# Patient Record
Sex: MALE | Race: WHITE | URBAN - METROPOLITAN AREA
[De-identification: names, ages, dates, MRNs, and addresses within clinical notes are randomized per-mention and may not be internally consistent; named-entity substitution may affect disease eponyms.]

---

## 2018-03-11 PROBLEM — R07.9 CHEST PAIN: Status: ACTIVE | Noted: 2018-03-11

## 2018-03-12 PROBLEM — Z72.0 TOBACCO ABUSE: Status: ACTIVE | Noted: 2018-03-12

## 2018-03-12 PROBLEM — R94.39 ABNORMAL NUCLEAR STRESS TEST: Status: ACTIVE | Noted: 2018-03-12

## 2018-03-23 ENCOUNTER — OFFICE VISIT (OUTPATIENT)
Dept: CARDIOLOGY CLINIC | Age: 58
End: 2018-03-23

## 2018-03-23 ENCOUNTER — TELEPHONE (OUTPATIENT)
Dept: CARDIOLOGY CLINIC | Age: 58
End: 2018-03-23

## 2018-03-23 VITALS
DIASTOLIC BLOOD PRESSURE: 94 MMHG | WEIGHT: 204.8 LBS | HEART RATE: 81 BPM | SYSTOLIC BLOOD PRESSURE: 144 MMHG | HEIGHT: 74 IN | BODY MASS INDEX: 26.28 KG/M2

## 2018-03-23 DIAGNOSIS — Z98.61 S/P PTCA (PERCUTANEOUS TRANSLUMINAL CORONARY ANGIOPLASTY): Primary | ICD-10-CM

## 2018-03-23 PROCEDURE — 99214 OFFICE O/P EST MOD 30 MIN: CPT | Performed by: INTERNAL MEDICINE

## 2018-03-23 PROCEDURE — 93000 ELECTROCARDIOGRAM COMPLETE: CPT | Performed by: INTERNAL MEDICINE

## 2018-03-23 NOTE — PROGRESS NOTES
Chela Yan MD        OFFICE  FOLLOWUP NOTE    Chief complaints: patient is here for management of chest pain, CAD,HTN, DYSLPIDEMIA. smoking    Subjective: patient feels better, occasional chest pain, no shortness of breath, no dizziness, no palpitations    HPI Chris Escalona is a 56 y. o.year old who  has a past medical history of DJD (degenerative joint disease); H/O cardiac catheterization; Hypertension; and S/P PTCA (percutaneous transluminal coronary angioplasty). and presents for management of chest pain, CAD,HTN, DYSLPIDEMIA. smoking which are well controlled, he still smokes,has  chest pain for last few weeks, happening daily, intermittent for 15 to 20 mins and aggravated with activity substernal also,reproducible with palpation, radiated to shoulder, 6/10, tender to touch,associated with shortness of breath, + sweating, nausea, he had two stents, one in OM1 and other in LCX. Current Outpatient Prescriptions   Medication Sig Dispense Refill    aspirin 81 MG chewable tablet Take 1 tablet by mouth daily 30 tablet 0    amLODIPine (NORVASC) 10 MG tablet Take 1 tablet by mouth daily 30 tablet 0    atorvastatin (LIPITOR) 80 MG tablet Take 1 tablet by mouth nightly 30 tablet 0    carvedilol (COREG) 3.125 MG tablet Take 1 tablet by mouth 2 times daily (with meals) 60 tablet 0    pantoprazole (PROTONIX) 40 MG tablet Take 1 tablet by mouth every morning (before breakfast) 30 tablet 0    clopidogrel (PLAVIX) 75 MG tablet Take 1 tablet by mouth daily 30 tablet 0    cyclobenzaprine (FLEXERIL) 10 MG tablet Take 1 tablet by mouth 3 times daily as needed for Muscle spasms 30 tablet 0     No current facility-administered medications for this visit. Allergies:  Other  Past Medical History:   Diagnosis Date    DJD (degenerative joint disease)     H/O cardiac catheterization 03/14/2018    Hypertension     S/P PTCA (percutaneous transluminal coronary angioplasty) 03/14/2018     History reviewed. No pertinent surgical history. History reviewed. No pertinent family history. Social History   Substance Use Topics    Smoking status: Current Every Day Smoker     Packs/day: 1.00     Types: Cigarettes    Smokeless tobacco: Current User     Types: Snuff    Alcohol use No      [unfilled]  Review of Systems:   · Constitutional: No Fever or Weight Loss   · Eyes: No Decreased Vision  · ENT: No Headaches, Hearing Loss or Vertigo  · Cardiovascular: No chest pain, dyspnea on exertion, palpitations or loss of consciousness  · Respiratory: No cough or wheezing    · Gastrointestinal: No abdominal pain, appetite loss, blood in stools, constipation, diarrhea or heartburn  · Genitourinary: No dysuria, trouble voiding, or hematuria  · Musculoskeletal:  No gait disturbance, weakness or joint complaints  · Integumentary: No rash or pruritis  · Neurological: No TIA or stroke symptoms  · Psychiatric: No anxiety or depression  · Endocrine: No malaise, fatigue or temperature intolerance  · Hematologic/Lymphatic: No bleeding problems, blood clots or swollen lymph nodes  · Allergic/Immunologic: No nasal congestion or hives  All systems negative except as marked. Objective:  BP (!) 144/94 (Site: Left Arm, Position: Sitting, Cuff Size: Large Adult)   Pulse 81   Ht 6' 2\" (1.88 m)   Wt 204 lb 12.8 oz (92.9 kg)   BMI 26.29 kg/m²   Wt Readings from Last 3 Encounters:   03/23/18 204 lb 12.8 oz (92.9 kg)   03/14/18 208 lb 14.4 oz (94.8 kg)   03/08/18 210 lb (95.3 kg)     Body mass index is 26.29 kg/m². GENERAL - Alert, oriented, pleasant, in no apparent distress,normal grooming  HEENT  pupils are reactive to light and accomodation, cornea intact, conjunctive normal color, ears are normal in exam,throat exam in normal, teeth, gum and palate are normal, oral mucosa is normal without any notation of pallor or cyanosis  Neck - Supple. No jugular venous distention noted.  No carotid bruits, no apical -carotid delay  Respiratory: continue lisinopril and lopressor  3. Arthritis: stable  4. RECOMMEND to stop smoking  5. Work restriction: will get stress test and then release for his work  6. Health maintenance: exerise and diet  All labs, medications and tests reviewed, continue all other medications of all above medical condition listed as is.     [unfilled]

## 2018-03-27 ENCOUNTER — PROCEDURE VISIT (OUTPATIENT)
Dept: CARDIOLOGY CLINIC | Age: 58
End: 2018-03-27

## 2018-03-27 VITALS
WEIGHT: 204 LBS | SYSTOLIC BLOOD PRESSURE: 142 MMHG | DIASTOLIC BLOOD PRESSURE: 78 MMHG | HEIGHT: 74 IN | BODY MASS INDEX: 26.18 KG/M2 | HEART RATE: 93 BPM

## 2018-03-27 DIAGNOSIS — Z98.61 S/P PTCA (PERCUTANEOUS TRANSLUMINAL CORONARY ANGIOPLASTY): ICD-10-CM

## 2018-03-27 PROCEDURE — 93015 CV STRESS TEST SUPVJ I&R: CPT | Performed by: INTERNAL MEDICINE

## 2018-03-29 ENCOUNTER — TELEPHONE (OUTPATIENT)
Dept: CARDIOLOGY CLINIC | Age: 58
End: 2018-03-29

## 2018-03-29 NOTE — TELEPHONE ENCOUNTER
Patient called stating he has stents put in on 3/21/18. He had G[employer] treadmill and has been referred to cardiac rehab. 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 He wants to know when he can return to work and would like his medications refilled. His follow up appointment is 6/22/18 after cardiac rehab and he want's to know if he should make a sooner appointment.

## 2018-04-02 RX ORDER — CARVEDILOL 3.12 MG/1
3.12 TABLET ORAL 2 TIMES DAILY WITH MEALS
Qty: 60 TABLET | Refills: 5 | Status: SHIPPED | OUTPATIENT
Start: 2018-04-02 | End: 2018-07-23 | Stop reason: SDUPTHER

## 2018-04-02 RX ORDER — ATORVASTATIN CALCIUM 80 MG/1
80 TABLET, FILM COATED ORAL NIGHTLY
Qty: 30 TABLET | Refills: 5 | Status: SHIPPED | OUTPATIENT
Start: 2018-04-02 | End: 2018-10-19 | Stop reason: SDUPTHER

## 2018-04-02 RX ORDER — AMLODIPINE BESYLATE 10 MG/1
10 TABLET ORAL DAILY
Qty: 30 TABLET | Refills: 5 | Status: SHIPPED | OUTPATIENT
Start: 2018-04-02 | End: 2018-10-19 | Stop reason: SDUPTHER

## 2018-04-02 RX ORDER — ASPIRIN 81 MG/1
81 TABLET, CHEWABLE ORAL DAILY
Qty: 30 TABLET | Refills: 5 | Status: SHIPPED | OUTPATIENT
Start: 2018-04-02 | End: 2018-10-19 | Stop reason: SDUPTHER

## 2018-04-02 RX ORDER — CLOPIDOGREL BISULFATE 75 MG/1
75 TABLET ORAL DAILY
Qty: 30 TABLET | Refills: 5 | Status: SHIPPED | OUTPATIENT
Start: 2018-04-02 | End: 2018-10-19 | Stop reason: SDUPTHER

## 2018-04-09 ENCOUNTER — TELEPHONE (OUTPATIENT)
Dept: CARDIOLOGY CLINIC | Age: 58
End: 2018-04-09

## 2018-07-23 ENCOUNTER — OFFICE VISIT (OUTPATIENT)
Dept: CARDIOLOGY CLINIC | Age: 58
End: 2018-07-23

## 2018-07-23 VITALS
BODY MASS INDEX: 26.15 KG/M2 | DIASTOLIC BLOOD PRESSURE: 88 MMHG | SYSTOLIC BLOOD PRESSURE: 150 MMHG | HEIGHT: 74 IN | HEART RATE: 84 BPM | WEIGHT: 203.8 LBS

## 2018-07-23 DIAGNOSIS — I10 ESSENTIAL HYPERTENSION: ICD-10-CM

## 2018-07-23 DIAGNOSIS — I25.10 CORONARY ARTERY DISEASE INVOLVING NATIVE CORONARY ARTERY OF NATIVE HEART WITHOUT ANGINA PECTORIS: Primary | ICD-10-CM

## 2018-07-23 PROCEDURE — 99214 OFFICE O/P EST MOD 30 MIN: CPT | Performed by: INTERNAL MEDICINE

## 2018-07-23 RX ORDER — CARVEDILOL 6.25 MG/1
6.25 TABLET ORAL 2 TIMES DAILY
Qty: 180 TABLET | Refills: 3 | Status: SHIPPED | OUTPATIENT
Start: 2018-07-23 | End: 2018-08-23 | Stop reason: SDUPTHER

## 2018-07-23 NOTE — PATIENT INSTRUCTIONS
Please remember to bring all medication bottles or a medication list with you to your appointment. If you have any questions, please call our office at [phone].

## 2018-07-23 NOTE — PROGRESS NOTES
cornea intact, conjunctive normal color, ears are normal in exam,throat exam in normal, teeth, gum and palate are normal, oral mucosa is normal without any notation of pallor or cyanosis  Neck - Supple. No jugular venous distention noted. No carotid bruits, no apical -carotid delay  Respiratory:  Normal breath sounds, No respiratory distress, No wheezing, No chest tenderness. ,no use of accessory muscles, diaphragm movement is normal  Cardiovascular: (PMI) apex non displaced,no lifts no thrills, no s3,no s4, Normal heart rate, Normal rhythm, No murmurs, No rubs, No gallops. Carotid arteries pulse and amplitude are normal no bruit, no abdominal bruit noted ( normal abdominal aorta ausculation), femoral arteries pulse and amplitude are normal no bruit, pedal pulses are normal  Femoral pulses have normal amplitude, no bruits   Extremities - No cyanosis, clubbing, or significant edema, no varicose veins    Abdomen  No masses, tenderness, or organomegaly, no hepato-splenomegally, no bruits  Musculoskeletal  No significant edema, no kyphosis or scoliosis, no deformity in any extremity noted, muscle strength and tone are normal  Skin: no ulcer,no scar,no stasis dermatitis   Neurologic  alert oriented times 3,Cranial nerves II through XII are grossly intact. There were no gross focal neurologic abnormalities. All sensory and motor nerves examined and were normal  Psychiatric: normal mood and affect    No results found for: CKTOTAL, CKMB, CKMBINDEX, TROPONINI  BNP:  No results found for: BNP  PT/INR:  No results found for: PTINR  Lab Results   Component Value Date    LABA1C 5.4 03/10/2018     Lab Results   Component Value Date    CHOL 195 03/11/2018    TRIG 135 03/11/2018    HDL 29 (L) 03/11/2018    LDLDIRECT 145 (H) 03/11/2018     Lab Results   Component Value Date    ALT 17 03/10/2018    AST 17 03/10/2018     TSH:  No results found for: TSH    Impression:  Pio Aviles is a 66 y. o.year old who  has a past medical history of DJD (degenerative joint disease); H/O cardiac catheterization; H/O percutaneous left heart catheterization; History of exercise stress test; Hypertension; and S/P PTCA (percutaneous transluminal coronary angioplasty). and presents with     Plan:  1. Chest pain\" S/P PCI of LCX AND OM\1, NORMAL echo, CONTINUE DAPT, BB, STATINS,  2. HTN\" continue coreg and norvasc,increase coreg to 6.25mg bid  3. Arthritis: stable  4. RECOMMEND to stop smoking  5. Health maintenance: exerise and diet  All labs, medications and tests reviewed, continue all other medications of all above medical condition listed as is.     @[facility]@

## 2018-08-13 ENCOUNTER — HOSPITAL ENCOUNTER (OUTPATIENT)
Dept: GENERAL RADIOLOGY | Age: 58
Discharge: HOME | End: 2018-08-13
Attending: INTERNAL MEDICINE | Admitting: INTERNAL MEDICINE

## 2018-08-13 LAB
ALBUMIN SERPL-MCNC: 3.8 GM/DL (ref 3.4–5)
ALP BLD-CCNC: 79 IU/L (ref 40–129)
ALT SERPL-CCNC: 28 U/L (ref 10–40)
AST SERPL-CCNC: 20 IU/L (ref 15–37)
BILIRUB SERPL-MCNC: 0.2 MG/DL (ref 0–1)
BILIRUBIN DIRECT: 0.2 MG/DL (ref 0–0.3)
BILIRUBIN, INDIRECT: 0 MG/DL (ref 0–0.7)
CHOLESTEROL: 99 MG/DL
HDLC SERPL-MCNC: 30 MG/DL
LDL CHOLESTEROL DIRECT: 68 MG/DL
TOTAL PROTEIN: 6.1 GM/DL (ref 6.4–8.2)
TRIGL SERPL-MCNC: 87 MG/DL

## 2018-08-23 DIAGNOSIS — I10 ESSENTIAL HYPERTENSION: ICD-10-CM

## 2018-08-23 DIAGNOSIS — I25.10 CORONARY ARTERY DISEASE INVOLVING NATIVE CORONARY ARTERY OF NATIVE HEART WITHOUT ANGINA PECTORIS: ICD-10-CM

## 2018-08-23 RX ORDER — CARVEDILOL 6.25 MG/1
6.25 TABLET ORAL 2 TIMES DAILY
Qty: 180 TABLET | Refills: 3 | Status: SHIPPED | OUTPATIENT
Start: 2018-08-23 | End: 2018-10-19 | Stop reason: SDUPTHER

## 2018-10-19 DIAGNOSIS — I10 ESSENTIAL HYPERTENSION: ICD-10-CM

## 2018-10-19 DIAGNOSIS — I25.10 CORONARY ARTERY DISEASE INVOLVING NATIVE CORONARY ARTERY OF NATIVE HEART WITHOUT ANGINA PECTORIS: ICD-10-CM

## 2018-10-19 RX ORDER — CLOPIDOGREL BISULFATE 75 MG/1
75 TABLET ORAL DAILY
Qty: 30 TABLET | Refills: 5 | Status: SHIPPED | OUTPATIENT
Start: 2018-10-19 | End: 2019-03-18 | Stop reason: SDUPTHER

## 2018-10-19 RX ORDER — ATORVASTATIN CALCIUM 80 MG/1
80 TABLET, FILM COATED ORAL NIGHTLY
Qty: 30 TABLET | Refills: 5 | Status: SHIPPED | OUTPATIENT
Start: 2018-10-19 | End: 2018-10-19 | Stop reason: SDUPTHER

## 2018-10-19 RX ORDER — CARVEDILOL 6.25 MG/1
6.25 TABLET ORAL 2 TIMES DAILY
Qty: 60 TABLET | Refills: 5 | Status: SHIPPED | OUTPATIENT
Start: 2018-10-19 | End: 2019-03-18 | Stop reason: SDUPTHER

## 2018-10-19 RX ORDER — ATORVASTATIN CALCIUM 80 MG/1
80 TABLET, FILM COATED ORAL NIGHTLY
Qty: 30 TABLET | Refills: 5 | Status: SHIPPED | OUTPATIENT
Start: 2018-10-19 | End: 2019-03-21 | Stop reason: SDUPTHER

## 2018-10-19 RX ORDER — ASPIRIN 81 MG/1
81 TABLET, CHEWABLE ORAL DAILY
Qty: 30 TABLET | Refills: 5 | Status: SHIPPED | OUTPATIENT
Start: 2018-10-19 | End: 2019-04-17 | Stop reason: SDUPTHER

## 2018-10-19 RX ORDER — AMLODIPINE BESYLATE 10 MG/1
10 TABLET ORAL DAILY
Qty: 30 TABLET | Refills: 5 | Status: SHIPPED | OUTPATIENT
Start: 2018-10-19 | End: 2019-03-18 | Stop reason: SDUPTHER

## 2019-03-15 DIAGNOSIS — I10 ESSENTIAL HYPERTENSION: ICD-10-CM

## 2019-03-15 DIAGNOSIS — I25.10 CORONARY ARTERY DISEASE INVOLVING NATIVE CORONARY ARTERY OF NATIVE HEART WITHOUT ANGINA PECTORIS: ICD-10-CM

## 2019-03-18 RX ORDER — CLOPIDOGREL BISULFATE 75 MG/1
75 TABLET ORAL DAILY
Qty: 30 TABLET | Refills: 5 | Status: SHIPPED | OUTPATIENT
Start: 2019-03-18 | End: 2019-03-20 | Stop reason: SDUPTHER

## 2019-03-18 RX ORDER — AMLODIPINE BESYLATE 10 MG/1
10 TABLET ORAL DAILY
Qty: 30 TABLET | Refills: 5 | Status: SHIPPED | OUTPATIENT
Start: 2019-03-18 | End: 2019-03-20 | Stop reason: SDUPTHER

## 2019-03-18 RX ORDER — CARVEDILOL 6.25 MG/1
6.25 TABLET ORAL 2 TIMES DAILY
Qty: 60 TABLET | Refills: 5 | Status: SHIPPED | OUTPATIENT
Start: 2019-03-18 | End: 2019-03-20 | Stop reason: SDUPTHER

## 2019-03-18 RX ORDER — PANTOPRAZOLE SODIUM 40 MG/1
40 TABLET, DELAYED RELEASE ORAL
Qty: 30 TABLET | Refills: 0 | Status: SHIPPED | OUTPATIENT
Start: 2019-03-18

## 2019-03-20 ENCOUNTER — TELEPHONE (OUTPATIENT)
Dept: CARDIOLOGY CLINIC | Age: 59
End: 2019-03-20

## 2019-03-20 DIAGNOSIS — I10 ESSENTIAL HYPERTENSION: ICD-10-CM

## 2019-03-20 DIAGNOSIS — I25.10 CORONARY ARTERY DISEASE INVOLVING NATIVE CORONARY ARTERY OF NATIVE HEART WITHOUT ANGINA PECTORIS: ICD-10-CM

## 2019-03-20 RX ORDER — CARVEDILOL 6.25 MG/1
6.25 TABLET ORAL 2 TIMES DAILY
Qty: 60 TABLET | Refills: 5 | Status: SHIPPED | OUTPATIENT
Start: 2019-03-20 | End: 2019-03-21 | Stop reason: SDUPTHER

## 2019-03-20 RX ORDER — AMLODIPINE BESYLATE 10 MG/1
10 TABLET ORAL DAILY
Qty: 30 TABLET | Refills: 5 | Status: SHIPPED | OUTPATIENT
Start: 2019-03-20 | End: 2019-03-21 | Stop reason: SDUPTHER

## 2019-03-20 RX ORDER — CLOPIDOGREL BISULFATE 75 MG/1
75 TABLET ORAL DAILY
Qty: 30 TABLET | Refills: 5 | Status: SHIPPED | OUTPATIENT
Start: 2019-03-20 | End: 2019-03-21 | Stop reason: SDUPTHER

## 2019-03-20 NOTE — TELEPHONE ENCOUNTER
Patient called 3/15 for several refills looks like   They may have been sent to to wrong pharmacy   They were to have been [facility]

## 2019-03-21 ENCOUNTER — OFFICE VISIT (OUTPATIENT)
Dept: CARDIOLOGY CLINIC | Age: 59
End: 2019-03-21

## 2019-03-21 ENCOUNTER — TELEPHONE (OUTPATIENT)
Dept: CARDIOLOGY CLINIC | Age: 59
End: 2019-03-21

## 2019-03-21 VITALS
DIASTOLIC BLOOD PRESSURE: 100 MMHG | WEIGHT: 211.2 LBS | SYSTOLIC BLOOD PRESSURE: 172 MMHG | HEART RATE: 86 BPM | HEIGHT: 74 IN | BODY MASS INDEX: 27.11 KG/M2

## 2019-03-21 DIAGNOSIS — I10 ESSENTIAL HYPERTENSION: ICD-10-CM

## 2019-03-21 DIAGNOSIS — Z98.61 S/P PTCA (PERCUTANEOUS TRANSLUMINAL CORONARY ANGIOPLASTY): ICD-10-CM

## 2019-03-21 DIAGNOSIS — I25.10 CORONARY ARTERY DISEASE INVOLVING NATIVE CORONARY ARTERY OF NATIVE HEART WITHOUT ANGINA PECTORIS: ICD-10-CM

## 2019-03-21 DIAGNOSIS — E78.49 OTHER HYPERLIPIDEMIA: ICD-10-CM

## 2019-03-21 DIAGNOSIS — R07.89 CHEST PRESSURE: ICD-10-CM

## 2019-03-21 DIAGNOSIS — Z72.0 TOBACCO ABUSE: Primary | ICD-10-CM

## 2019-03-21 PROCEDURE — 99214 OFFICE O/P EST MOD 30 MIN: CPT | Performed by: NURSE PRACTITIONER

## 2019-03-21 PROCEDURE — 93000 ELECTROCARDIOGRAM COMPLETE: CPT | Performed by: INTERNAL MEDICINE

## 2019-03-21 RX ORDER — ATORVASTATIN CALCIUM 80 MG/1
80 TABLET, FILM COATED ORAL NIGHTLY
Qty: 30 TABLET | Refills: 5 | Status: SHIPPED | OUTPATIENT
Start: 2019-03-21 | End: 2019-04-17 | Stop reason: SDUPTHER

## 2019-03-21 RX ORDER — CARVEDILOL 6.25 MG/1
6.25 TABLET ORAL 2 TIMES DAILY
Qty: 60 TABLET | Refills: 5 | Status: SHIPPED | OUTPATIENT
Start: 2019-03-21 | End: 2019-04-17 | Stop reason: SDUPTHER

## 2019-03-21 RX ORDER — OXYCODONE AND ACETAMINOPHEN 7.5; 325 MG/1; MG/1
1 TABLET ORAL EVERY 4 HOURS PRN
COMMUNITY

## 2019-03-21 RX ORDER — CLOPIDOGREL BISULFATE 75 MG/1
75 TABLET ORAL DAILY
Qty: 30 TABLET | Refills: 5 | Status: SHIPPED | OUTPATIENT
Start: 2019-03-21 | End: 2019-04-17 | Stop reason: SDUPTHER

## 2019-03-21 RX ORDER — AMLODIPINE BESYLATE 10 MG/1
10 TABLET ORAL DAILY
Qty: 30 TABLET | Refills: 5 | Status: SHIPPED | OUTPATIENT
Start: 2019-03-21 | End: 2019-04-17 | Stop reason: SDUPTHER

## 2019-03-21 ASSESSMENT — ENCOUNTER SYMPTOMS
DIARRHEA: 0
EYE REDNESS: 0
VOMITING: 0
CHEST TIGHTNESS: 1
CONSTIPATION: 0
SINUS PAIN: 0
COLOR CHANGE: 0
SHORTNESS OF BREATH: 0
BACK PAIN: 1
SINUS PRESSURE: 0
EYE ITCHING: 0
NAUSEA: 0

## 2019-04-17 ENCOUNTER — OFFICE VISIT (OUTPATIENT)
Dept: CARDIOLOGY CLINIC | Age: 59
End: 2019-04-17

## 2019-04-17 VITALS
HEART RATE: 82 BPM | HEIGHT: 74 IN | DIASTOLIC BLOOD PRESSURE: 88 MMHG | SYSTOLIC BLOOD PRESSURE: 148 MMHG | WEIGHT: 206 LBS | BODY MASS INDEX: 26.44 KG/M2

## 2019-04-17 DIAGNOSIS — I25.10 CORONARY ARTERY DISEASE INVOLVING NATIVE CORONARY ARTERY OF NATIVE HEART WITHOUT ANGINA PECTORIS: ICD-10-CM

## 2019-04-17 DIAGNOSIS — I10 ESSENTIAL HYPERTENSION: ICD-10-CM

## 2019-04-17 PROCEDURE — 99214 OFFICE O/P EST MOD 30 MIN: CPT | Performed by: INTERNAL MEDICINE

## 2019-04-17 RX ORDER — CLOPIDOGREL BISULFATE 75 MG/1
75 TABLET ORAL DAILY
Qty: 30 TABLET | Refills: 5 | Status: SHIPPED | OUTPATIENT
Start: 2019-04-17

## 2019-04-17 RX ORDER — ASPIRIN 81 MG/1
81 TABLET, CHEWABLE ORAL DAILY
Qty: 30 TABLET | Refills: 5 | Status: SHIPPED | OUTPATIENT
Start: 2019-04-17

## 2019-04-17 RX ORDER — ATORVASTATIN CALCIUM 80 MG/1
80 TABLET, FILM COATED ORAL NIGHTLY
Qty: 30 TABLET | Refills: 5 | Status: SHIPPED | OUTPATIENT
Start: 2019-04-17 | End: 2019-10-31 | Stop reason: SDUPTHER

## 2019-04-17 RX ORDER — AMLODIPINE BESYLATE 10 MG/1
10 TABLET ORAL DAILY
Qty: 30 TABLET | Refills: 5 | Status: SHIPPED | OUTPATIENT
Start: 2019-04-17 | End: 2019-10-31 | Stop reason: SDUPTHER

## 2019-04-17 RX ORDER — CARVEDILOL 6.25 MG/1
6.25 TABLET ORAL 2 TIMES DAILY
Qty: 60 TABLET | Refills: 5 | Status: SHIPPED | OUTPATIENT
Start: 2019-04-17 | End: 2019-10-31 | Stop reason: SDUPTHER

## 2019-04-17 NOTE — PROGRESS NOTES
John Keller MD        OFFICE  FOLLOWUP NOTE    Chief complaints: patient is here for management of  chest pain, CAD,HTN, DYSLPIDEMIA. smoking        Subjective: patient feels better, no chest pain, no shortness of breath, no dizziness, no palpitations    HPI Felicia Leventhal is a 64 y. o.year old who  has a past medical history of DJD (degenerative joint disease), H/O cardiac catheterization, H/O percutaneous left heart catheterization, History of exercise stress test, Hypertension, and S/P PTCA (percutaneous transluminal coronary angioplasty). and presents for management of chest pain, CAD,HTN, DYSLPIDEMIA. smoking   which are well controlled    Patient is not working as his foot was injured at work, he is not taking any medications for 4 months  Current Outpatient Medications   Medication Sig Dispense Refill    amLODIPine (NORVASC) 10 MG tablet Take 1 tablet by mouth daily 30 tablet 5    carvedilol (COREG) 6.25 MG tablet Take 1 tablet by mouth 2 times daily 60 tablet 5    clopidogrel (PLAVIX) 75 MG tablet Take 1 tablet by mouth daily 30 tablet 5    atorvastatin (LIPITOR) 80 MG tablet Take 1 tablet by mouth nightly 30 tablet 5    oxyCODONE-acetaminophen (PERCOCET) 7.5-325 MG per tablet Take 1 tablet by mouth every 4 hours as needed for Pain.  pantoprazole (PROTONIX) 40 MG tablet Take 1 tablet by mouth every morning (before breakfast) 30 tablet 0    aspirin 81 MG chewable tablet Take 1 tablet by mouth daily 30 tablet 5    cyclobenzaprine (FLEXERIL) 10 MG tablet Take 1 tablet by mouth 3 times daily as needed for Muscle spasms 30 tablet 0     No current facility-administered medications for this visit. Allergies:  Other  Past Medical History:   Diagnosis Date    DJD (degenerative joint disease)     H/O cardiac catheterization 03/14/2018    Stent in OM and Ramus are patent FFR of LAD was 0.81 LAD has mild to moderate lesions proximal area has 30 % lesion and mid has 50 % lesion after diagonal but FFR was normal RCA is widely patent    H/O percutaneous left heart catheterization 03/12/2018    Critical disease of Ramus & OM1. Mild LAD disease. Normal LV systolic function & wall motion. LVEF is > 55 %. Successful stenting of Ramus with excellent results.  History of exercise stress test 03/27/2018    treadmill    Hypertension     S/P PTCA (percutaneous transluminal coronary angioplasty) 03/12/2018    OM1 & LCx stented per . History reviewed. No pertinent surgical history. Family History   Problem Relation Age of Onset    Hypertension Father      Social History     Tobacco Use    Smoking status: Current Every Day Smoker     Packs/day: 1.00     Types: Cigarettes    Smokeless tobacco: Current User     Types: Snuff   Substance Use Topics    Alcohol use: No      [unfilled]  Review of Systems:   · Constitutional: No Fever or Weight Loss   · Eyes: No Decreased Vision  · ENT: No Headaches, Hearing Loss or Vertigo  · Cardiovascular: No chest pain, dyspnea on exertion, palpitations or loss of consciousness  · Respiratory: No cough or wheezing    · Gastrointestinal: No abdominal pain, appetite loss, blood in stools, constipation, diarrhea or heartburn  · Genitourinary: No dysuria, trouble voiding, or hematuria  · Musculoskeletal:  No gait disturbance, weakness or joint complaints  · Integumentary: No rash or pruritis  · Neurological: No TIA or stroke symptoms  · Psychiatric: No anxiety or depression  · Endocrine: No malaise, fatigue or temperature intolerance  · Hematologic/Lymphatic: No bleeding problems, blood clots or swollen lymph nodes  · Allergic/Immunologic: No nasal congestion or hives  All systems negative except as marked.    Objective:  BP (!) 148/88   Pulse 82   Ht 6' 2\" (1.88 m)   Wt 206 lb (93.4 kg)   BMI 26.45 kg/m²   Wt Readings from Last 3 Encounters:   04/17/19 206 lb (93.4 kg)   03/21/19 211 lb 3.2 oz (95.8 kg)   07/23/18 203 lb 12.8 oz (92.4 kg)     Body mass index is 26.45 AST 20 08/13/2018     TSH:  No results found for: TSH    Impression:  Spenser Harley is a 64 y. o.year old who  has a past medical history of DJD (degenerative joint disease), H/O cardiac catheterization, H/O percutaneous left heart catheterization, History of exercise stress test, Hypertension, and S/P PTCA (percutaneous transluminal coronary angioplasty). and presents with     Plan:  1. Chest pain\" S/P PCI of LCX AND OM\1, NORMAL echo, refill DAPT, BB, STATINS, ( he was not taking it for 4 months)  2. HTN\" continue coreg and norvasc,  3. Arthritis: stable\\\  4. Rt foot injury: stable  5. RECOMMEND to stop smoking  Health maintenance: exerise and diet  All labs, medications and tests reviewed, continue all other medications of all above medical condition listed as is.

## 2019-07-09 ENCOUNTER — TELEPHONE (OUTPATIENT)
Dept: CARDIOLOGY CLINIC | Age: 59
End: 2019-07-09

## 2019-10-31 ENCOUNTER — TELEPHONE (OUTPATIENT)
Dept: CARDIOLOGY CLINIC | Age: 59
End: 2019-10-31

## 2019-10-31 DIAGNOSIS — I25.10 CORONARY ARTERY DISEASE INVOLVING NATIVE CORONARY ARTERY OF NATIVE HEART WITHOUT ANGINA PECTORIS: ICD-10-CM

## 2019-10-31 DIAGNOSIS — I10 ESSENTIAL HYPERTENSION: ICD-10-CM

## 2019-11-01 RX ORDER — ATORVASTATIN CALCIUM 80 MG/1
80 TABLET, FILM COATED ORAL NIGHTLY
Qty: 30 TABLET | Refills: 1 | Status: SHIPPED | OUTPATIENT
Start: 2019-11-01

## 2019-11-01 RX ORDER — AMLODIPINE BESYLATE 10 MG/1
10 TABLET ORAL DAILY
Qty: 30 TABLET | Refills: 1 | Status: SHIPPED | OUTPATIENT
Start: 2019-11-01

## 2019-11-01 RX ORDER — CARVEDILOL 6.25 MG/1
6.25 TABLET ORAL 2 TIMES DAILY
Qty: 60 TABLET | Refills: 1 | Status: SHIPPED | OUTPATIENT
Start: 2019-11-01

## 2020-04-30 ENCOUNTER — TELEPHONE (OUTPATIENT)
Dept: CARDIOLOGY CLINIC | Age: 60
End: 2020-04-30

## 2022-11-07 ENCOUNTER — TELEPHONE (OUTPATIENT)
Dept: ORTHOPEDIC SURGERY | Age: 62
End: 2022-11-07

## 2022-11-07 NOTE — TELEPHONE ENCOUNTER
Called and spoke with Denise Uriostegui, assistant to  [place]. Fred Crowe with the [facility] [facility] [employer] of [employer]. Wanted to let them know that I was unable to get in touch with pt to schedule an independent medical exam. Requested that she try to contact pt and request an exam for any Thursday at [facility] with [facility] at 11:45am. Also, confirmed that she did receive the fax regarding payment for the exam. Ramiro Roberts that if I did not hear back from her, I would try to contact her in a few days.

## 2022-11-08 ENCOUNTER — TELEPHONE (OUTPATIENT)
Dept: ORTHOPEDIC SURGERY | Age: 62
End: 2022-11-08

## 2022-11-08 NOTE — TELEPHONE ENCOUNTER
Returned call from Gracia, assistant to  Marilyn Lowry. Yuliana Sue with the [facility] [employer] of Apoorva Enriquez, and scheduled pt for the 1st Thursday he was available for an apt.

## 2022-12-01 ENCOUNTER — OFFICE VISIT (OUTPATIENT)
Dept: ORTHOPEDIC SURGERY | Age: 62
End: 2022-12-01

## 2022-12-01 VITALS — WEIGHT: 220 LBS | BODY MASS INDEX: 28.23 KG/M2 | HEIGHT: 74 IN

## 2022-12-01 DIAGNOSIS — M25.571 RIGHT ANKLE PAIN, UNSPECIFIED CHRONICITY: ICD-10-CM

## 2022-12-01 DIAGNOSIS — G62.9 SENSORY MOTOR NEUROPATHY: ICD-10-CM

## 2022-12-01 DIAGNOSIS — M79.671 RIGHT FOOT PAIN: Primary | ICD-10-CM

## 2022-12-01 DIAGNOSIS — M19.171 POST-TRAUMATIC OSTEOARTHRITIS OF RIGHT FOOT: ICD-10-CM

## 2022-12-01 DIAGNOSIS — M21.6X1 ACQUIRED CAVOVARUS FOOT DEFORMITY, RIGHT: ICD-10-CM

## 2022-12-01 DIAGNOSIS — M19.171 POST-TRAUMATIC OSTEOARTHRITIS OF RIGHT ANKLE: ICD-10-CM

## 2022-12-01 NOTE — PROGRESS NOTES
Name: José Manuel Sharma  YOB: 1960  Gender: male  MRN: [id number]    CC: Right ankle/foot    HPI:   11/03/2018: Crush injury  12/01/2022: He presents for independent medical evaluation regarding his right ankle/foot posttraumatic concerns. ROS/Meds/PSH/PMH/FH/SH: reviewed today    Tobacco:  reports that he has been smoking cigarettes. He has been smoking an average of 1 pack per day. His smokeless tobacco use includes snuff. Physical Examination:  Patient appears to be alert and oriented with acceptable appearance. No obvious distress or SOB  CV: appears to have acceptable vascular color and capillary refill  Neuro: Diminished light touch sensation dorsal foot to medial arch; intact sensation plantar foot  Skin: No soft tissue swelling; posttraumatic wound and scarring  MS: Standing: Left plantigrade foot: Right cavovarus: Gait without brace, circumduction: Gait with brace within normal limits  Right = no knee pain; no ankle or foot pain  Right = passive motion; limited hindfoot motion ankle dorsiflexion comparing side to side  Right = active motion; anterior compartment 4-/5 strength; lateral and posterior compartment 5/5 strength    XR: Right side: Standing AP lateral mortise ankle plus AP oblique foot taken today with posttraumatic changes in his anterior calcaneal process with arthritic spurring; lateral talar avulsion fracture; anterior ankle impingement  XR Impression:  As above      Reviewed Test/Records/Documents:  11/03/2018: [facility] regional ED: ER MD reflects injury when steel fell off a forklift onto the dorsal right foot and on the medial left ankle. Laceration right foot with decreased sensation.   11/03/2022: X-rays left foot radiology impression: No evidence of acute osseous abnormality  11/03/2018: X-ray 3 views left ankle radiologic impression: No evidence of acute osseous abnormality  11/03/2018: X-ray right foot 3 views radiologic impression: Questionable subtle avulsion type fracture along the anterior dorsum of the talus. A questionable ossific fragment is noted medial to the talus. Diffuse edema about the ankle greatest medially. There is associated subcutaneous emphysema throughout the soft tissues of the medial ankle  11/03/2018: Right ankle 3 views radiologic impression: Suspected tiny avulsion type fracture along the anterior dorsum of the talus. Suspected bone fragment noted medial to the talus. Diffuse ankle edema with subcutaneous gas foci noted medially and posteriorly  11/03/2018: X-ray right tibia/fibula 2 views radiologic impression: Acute fracture fragment noted medial to the talus. Diffuse ankle edema with subcutaneous gas foci noted posteriorly and medially  04/05/2019: FCE indication of heavy workability according to the PVL physical demand level as defined by the [address] [facility] [facility] Department of Labor  04/18/2019: Dr. Anusha Adair reflects 3000 pounds of steel mold off a forklift and hit the back of his feet/heel cord area right leg. Reflects right foot and recommended neurologic consultation. Also reflects FCE and disagreed on several points including standing and walking. Reflects the FCE was done by an  not a therapist.  Dr. Anusha Adair recommended more PT and recommended out of work  05/29/2019: Dr. Pierre Weinstein neurologic [employer] reflects history as outlined prior. He also reflects that the patient was able to walk 15 minutes before he started experiencing pain in his leg. .. Balance issues. .. Pain in the right ankle and foot if he drives for prolonged periods of time. Also reflects that he had been seen by sports medicine/occupational medicine and orthopedic surgery who did not find any clear orthopedic or musculoskeletal cause of his persistent weakness. Dr. Pierre Weinstein opined. .. Crush injury to his right ankle and foot with residual weakness of right dorsiflexion, toe flexion, and numbness along the medial aspect of the right ankle.   These deficits do not localize to one particular nerve as it implicate the deep peroneal, distal tibial and distal saphenous nerves respectively. These are likely secondary to the injury he sustained in November 2018 and given the time since then with incomplete recovery he is unlikely to have full recovery of strength in that area though there could possibly be some minor improvement over the next 6 months or so. ... Suggest some mild autonomic dysfunction in the limb. .. Dr. 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 We explored treatment options. Due to the length of time from his injury, I feel his motor/sensory neuropathy and contractures are permanent  Due to his deficient anterior compartment but intact lateral and posterior compartments, he  developed a cavovarus foot and an Achilles contracture  Surprisingly; however, he has managed well and has a fairly normal gait with his toe off AFO brace and hightop boots    Advanced medical imaging: I see no indication for a MRI scan or CT scan  Medication: I see no indication for routine medication  PT: I see no indication for new PT since I feel his conditions are chronic and permanent    I cannot attest to his prior FCE, but I do not feel he is capable of heavy physical demand labor work related to his posttraumatic conditions  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 Since he is 4 years from his injury and appears to be have a brace dependent stable posttraumatic ankle/foot, I opine that it is less likely that he would require surgery entailing:   Right Achilles lengthening: Right cavovarus reconstruction: Right posterior tibial tendon anterior transposition transfer    Bracing: Due to his permanent posttraumatic conditions, I opine that he needs permanent insoles, brace and shoes:  Right cavovarus specific custom insole: Left normal foot position custom insole - each one replaced every 2 years  Right toe off AFO brace replaced every 2 years  Accommodative hightop boots/shoes replaced yearly    Rating: Due to his permanent posttraumatic deformity, ankle/hindfoot limitations motor and sensory neuropathy and brace dependency, I opine that he has a permanent impairment that warrant rating   Ratin %: Right lower extremity      This note was created using Dragon voice recognition software which may result in errors of speech and spelling recognition and word/phrase syntax errors.

## 2022-12-05 ENCOUNTER — CLINICAL DOCUMENTATION (OUTPATIENT)
Dept: ORTHOPEDIC SURGERY | Age: 62
End: 2022-12-05

## 2022-12-05 NOTE — PROGRESS NOTES
Faxed last office note (SIERRA) to [employer]. Stefania Tesfaye with the [email or web address] of Guillermina Navas at fax #[phone]. Received a complete.

## 2023-04-17 ENCOUNTER — TELEPHONE (OUTPATIENT)
Dept: ORTHOPEDIC SURGERY | Age: 63
End: 2023-04-17

## 2023-04-17 NOTE — TELEPHONE ENCOUNTER
Returned Janet Ignacio with [employer] [facility]. Told her that if the deposition date moved, [facility] was fine with not charging again. If they cancelled altogether, they would not be issued a refund.

## 2023-04-18 ENCOUNTER — TELEPHONE (OUTPATIENT)
Dept: ORTHOPEDIC SURGERY | Age: 63
End: 2023-04-18

## 2023-04-18 NOTE — TELEPHONE ENCOUNTER
Returned Norman Ardon with the [facility] [employer] of [employer]. I let her know that I received her e-mail regarding the postponement of the deposition.

## 2023-05-31 ENCOUNTER — TELEPHONE (OUTPATIENT)
Dept: ORTHOPEDIC SURGERY | Age: 63
End: 2023-05-31

## 2023-05-31 NOTE — TELEPHONE ENCOUNTER
Returned [employer] and  with Linzy Gilford. Sanjay Richardson I am not sure what would be owed on medical bills for pt but all deposition payments are complete.

## 2023-05-31 NOTE — TELEPHONE ENCOUNTER
Shanti Haque is calling to let Brigitte Temple know that this case has settled and she wants to make sure Dr. Chi Atkinson doesn't have any outstanding balances for this patient.

## 2023-06-01 ENCOUNTER — TELEPHONE (OUTPATIENT)
Dept: ORTHOPEDIC SURGERY | Age: 63
End: 2023-06-01

## 2023-06-01 NOTE — TELEPHONE ENCOUNTER
Jennifer lynne/ law of [facility] [phone] returning Grayson call

## 2025-07-28 ENCOUNTER — TELEPHONE (OUTPATIENT)
Dept: ORTHOPEDIC SURGERY | Age: 65
End: 2025-07-28

## 2025-07-28 NOTE — TELEPHONE ENCOUNTER
They mailed a check on April 4, 2023 in the amount of $1000. This was for a deposition with [facility] and it was cancelled. The check never was deposited or cleared the bank. Our invoice says cancellations are forfeited. She wants to know if she needs to mail another check to clear their books. Please let her know.

## 2025-07-29 NOTE — TELEPHONE ENCOUNTER
Returned Steffi's call. After confirming with [facility], told Steffi there was no need to resend the check from .